# Patient Record
Sex: FEMALE | Race: WHITE | NOT HISPANIC OR LATINO | Employment: OTHER | URBAN - METROPOLITAN AREA
[De-identification: names, ages, dates, MRNs, and addresses within clinical notes are randomized per-mention and may not be internally consistent; named-entity substitution may affect disease eponyms.]

---

## 2023-05-16 ENCOUNTER — HOSPITAL ENCOUNTER (EMERGENCY)
Facility: MEDICAL CENTER | Age: 73
End: 2023-05-16
Attending: EMERGENCY MEDICINE
Payer: MEDICARE

## 2023-05-16 VITALS
HEART RATE: 68 BPM | OXYGEN SATURATION: 94 % | TEMPERATURE: 98.6 F | WEIGHT: 158.73 LBS | RESPIRATION RATE: 17 BRPM | DIASTOLIC BLOOD PRESSURE: 98 MMHG | SYSTOLIC BLOOD PRESSURE: 224 MMHG

## 2023-05-16 DIAGNOSIS — K76.89 HEPATIC CYST: ICD-10-CM

## 2023-05-16 DIAGNOSIS — R10.9 ABDOMINAL DISCOMFORT: ICD-10-CM

## 2023-05-16 DIAGNOSIS — R11.15 CYCLICAL VOMITING: ICD-10-CM

## 2023-05-16 LAB
ALBUMIN SERPL BCP-MCNC: 4.5 G/DL (ref 3.2–4.9)
ALBUMIN/GLOB SERPL: 1.5 G/DL
ALP SERPL-CCNC: 56 U/L (ref 30–99)
ALT SERPL-CCNC: 21 U/L (ref 2–50)
ANION GAP SERPL CALC-SCNC: 12 MMOL/L (ref 7–16)
AST SERPL-CCNC: 24 U/L (ref 12–45)
BASOPHILS # BLD AUTO: 1.3 % (ref 0–1.8)
BASOPHILS # BLD: 0.07 K/UL (ref 0–0.12)
BILIRUB SERPL-MCNC: 0.7 MG/DL (ref 0.1–1.5)
BUN SERPL-MCNC: 13 MG/DL (ref 8–22)
CALCIUM ALBUM COR SERPL-MCNC: 8.8 MG/DL (ref 8.5–10.5)
CALCIUM SERPL-MCNC: 9.2 MG/DL (ref 8.5–10.5)
CHLORIDE SERPL-SCNC: 103 MMOL/L (ref 96–112)
CO2 SERPL-SCNC: 25 MMOL/L (ref 20–33)
CREAT SERPL-MCNC: 0.73 MG/DL (ref 0.5–1.4)
EKG IMPRESSION: NORMAL
EOSINOPHIL # BLD AUTO: 0.17 K/UL (ref 0–0.51)
EOSINOPHIL NFR BLD: 3.1 % (ref 0–6.9)
ERYTHROCYTE [DISTWIDTH] IN BLOOD BY AUTOMATED COUNT: 44.2 FL (ref 35.9–50)
GFR SERPLBLD CREATININE-BSD FMLA CKD-EPI: 86 ML/MIN/1.73 M 2
GLOBULIN SER CALC-MCNC: 3 G/DL (ref 1.9–3.5)
GLUCOSE SERPL-MCNC: 89 MG/DL (ref 65–99)
HCT VFR BLD AUTO: 47.5 % (ref 37–47)
HGB BLD-MCNC: 15.6 G/DL (ref 12–16)
IMM GRANULOCYTES # BLD AUTO: 0.01 K/UL (ref 0–0.11)
IMM GRANULOCYTES NFR BLD AUTO: 0.2 % (ref 0–0.9)
LIPASE SERPL-CCNC: 44 U/L (ref 11–82)
LYMPHOCYTES # BLD AUTO: 1.86 K/UL (ref 1–4.8)
LYMPHOCYTES NFR BLD: 34 % (ref 22–41)
MCH RBC QN AUTO: 30.6 PG (ref 27–33)
MCHC RBC AUTO-ENTMCNC: 32.8 G/DL (ref 33.6–35)
MCV RBC AUTO: 93.3 FL (ref 81.4–97.8)
MONOCYTES # BLD AUTO: 0.44 K/UL (ref 0–0.85)
MONOCYTES NFR BLD AUTO: 8 % (ref 0–13.4)
NEUTROPHILS # BLD AUTO: 2.92 K/UL (ref 2–7.15)
NEUTROPHILS NFR BLD: 53.4 % (ref 44–72)
NRBC # BLD AUTO: 0 K/UL
NRBC BLD-RTO: 0 /100 WBC
PLATELET # BLD AUTO: 255 K/UL (ref 164–446)
PMV BLD AUTO: 10.9 FL (ref 9–12.9)
POTASSIUM SERPL-SCNC: 3.9 MMOL/L (ref 3.6–5.5)
PROT SERPL-MCNC: 7.5 G/DL (ref 6–8.2)
RBC # BLD AUTO: 5.09 M/UL (ref 4.2–5.4)
SODIUM SERPL-SCNC: 140 MMOL/L (ref 135–145)
WBC # BLD AUTO: 5.5 K/UL (ref 4.8–10.8)

## 2023-05-16 PROCEDURE — 99284 EMERGENCY DEPT VISIT MOD MDM: CPT

## 2023-05-16 PROCEDURE — 36415 COLL VENOUS BLD VENIPUNCTURE: CPT

## 2023-05-16 PROCEDURE — 80053 COMPREHEN METABOLIC PANEL: CPT

## 2023-05-16 PROCEDURE — 93005 ELECTROCARDIOGRAM TRACING: CPT | Performed by: EMERGENCY MEDICINE

## 2023-05-16 PROCEDURE — 700102 HCHG RX REV CODE 250 W/ 637 OVERRIDE(OP): Performed by: EMERGENCY MEDICINE

## 2023-05-16 PROCEDURE — 85025 COMPLETE CBC W/AUTO DIFF WBC: CPT

## 2023-05-16 PROCEDURE — 83690 ASSAY OF LIPASE: CPT

## 2023-05-16 PROCEDURE — A9270 NON-COVERED ITEM OR SERVICE: HCPCS | Performed by: EMERGENCY MEDICINE

## 2023-05-16 RX ORDER — SUCRALFATE ORAL 1 G/10ML
1 SUSPENSION ORAL 4 TIMES DAILY PRN
Qty: 414 ML | Refills: 2 | Status: SHIPPED | OUTPATIENT
Start: 2023-05-16 | End: 2023-05-26

## 2023-05-16 RX ORDER — DICYCLOMINE HCL 20 MG
20 TABLET ORAL EVERY 6 HOURS PRN
Qty: 120 TABLET | Refills: 1 | Status: SHIPPED | OUTPATIENT
Start: 2023-05-16 | End: 2023-07-15

## 2023-05-16 RX ORDER — LOSARTAN POTASSIUM 50 MG/1
50 TABLET ORAL ONCE
Status: COMPLETED | OUTPATIENT
Start: 2023-05-16 | End: 2023-05-16

## 2023-05-16 RX ADMIN — LOSARTAN POTASSIUM 50 MG: 50 TABLET, FILM COATED ORAL at 17:34

## 2023-05-16 NOTE — ED NOTES
Pt comes in complaining of left upper abd pain. Pt stating she has had the pain for approx 10 days. Pt reporting the same thing happened 2 years ago. Pt stating she did have a CT scan at another facility. Pt found to have an irregular heartbeat, denies hx of the same.

## 2023-05-17 NOTE — ED NOTES
Pt discharged home as ordered by erp. Pt given 2 paper scripts and instructed to follow up with her PCP and GI. Pt verbalized understanding. Pt left ambulating independently with her

## 2023-05-17 NOTE — ED PROVIDER NOTES
"ED Provider Note    CHIEF COMPLAINT  Chief Complaint   Patient presents with    Abdominal Pain     Seen in [facility] for same.  Pt states instructed to come to ED for eval of a cyst on her liver       EXTERNAL RECORDS REVIEWED  Patient presents with recently performed outside imagings showing a 10 cm benign-appearing right liver cyst.    HPI/ROS  LIMITATION TO HISTORY       OUTSIDE HISTORIAN(S):   at bedside contributes to history.    Zarina Hagen is a pleasant, anxious, cooperative 72 y.o. female who presents to the emergency department reporting episodes of left upper quadrant abdominal pain associated with nausea and vomiting, intermittently for the past 2 to 3 years.  She has had episodes recently, but denies current symptoms.  She cannot identify exacerbating or relieving factors for these symptoms.  They resolve spontaneously.  She sometimes takes Aleve, but does not take any other medications for the symptoms.  She had an outside hospital CT scan showing a 2 cm benign-appearing renal cyst, and a 10 cm benign-appearing right liver cyst.  She has been followed by her primary care provider, who she says was considering referring her to gastroenterology.  However, the patient says she was told to drive to this hospital from [place] if she had more of these \"attacks.\"This most recent episode resolved prior to arrival.  She denies dysuria, fevers, constipation or diarrhea.  As above, she does not take any GI medications, she also does not take stool softeners.  She has a known history of hypertension and her primary care provider recently increased her Cozaar from 25 to 50 mg.  She has not taken it yet today.      PAST MEDICAL HISTORY   Chronic/episodic abdominal pain with vomiting    SURGICAL HISTORY  patient denies any surgical history    FAMILY HISTORY  No family history on file.    SOCIAL HISTORY  Social History     Tobacco Use    Smoking status: Not on file    Smokeless tobacco: Not on file   Substance " and Sexual Activity    Alcohol use: Not on file    Drug use: Not on file    Sexual activity: Not on file       CURRENT MEDICATIONS  Home Medications    **Home medications have not yet been reviewed for this encounter**         ALLERGIES  No Known Allergies      PHYSICAL EXAM  VITAL SIGNS: BP (!) 224/98   Pulse 68   Temp 37 °C (98.6 °F) (Temporal)   Resp 17   Wt 72 kg (158 lb 11.7 oz)   SpO2 94%   Pulse ox interpretation: I interpret this pulse ox as normal.  Constitutional: Alert in no apparent distress.  HENT: No signs of trauma, Bilateral external ears normal, Nose normal.   Eyes: Conjunctiva normal, Non-icteric.   Neck: Normal range of motion, Supple, No stridor.   Lymphatic: No lymphadenopathy noted.   Cardiovascular: Regular rate and rhythm, no murmurs.   Thorax & Lungs: Normal breath sounds, No respiratory distress, No wheezing  Abdomen: Bowel sounds normal, Soft, No tenderness, No masses, No pulsatile masses. No peritoneal signs.  Skin: Warm, Dry, No erythema, No rash.   Extremities: Intact distal pulses, No edema, No cyanosis.  Musculoskeletal: Good range of motion in all major joints. No or major deformities noted.   Neurologic: Alert , Normal motor function, Normal sensory function, No focal deficits noted.   Psychiatric: Affect anxious, judgment normal, Mood normal.         DIAGNOSTIC STUDIES / PROCEDURES  EKG  I have independently interpreted this EKG  Results for orders placed or performed during the hospital encounter of 23   EKG   Result Value Ref Range    Report       [facility] Emergency Dept.    Test Date:  2023  Pt Name:    GERRY KENDALL               Department: ER  MRN:        [id number]                      Room:       [facility]  Gender:     Female                       Technician: [id number]  :        [id number]                   Requested By:MELY IBARRA  Order #:    [id number]                    Reading MD: MELY IBARRA MD    Measurements  Intervals                                 Axis  Rate:       88                           P:          35  IN:         167                          QRS:        -39  QRSD:       100                          T:          28  QT:         424  QTc:        513    Interpretive Statements  Sinus rhythm  Multiform ventricular premature complexes  RSR' in V1 or V2, right VCD or RVH  Inferior infarct, old  No previous ECG available for comparison  Electronically Signed On 5- 17:15:10 PDT by MELY IBARRA MD           LABS  Labs Reviewed   CBC WITH DIFFERENTIAL - Abnormal; Notable for the following components:       Result Value    Hematocrit 47.5 (*)     MCHC 32.8 (*)     All other components within normal limits   COMP METABOLIC PANEL   LIPASE   CORRECTED CALCIUM   ESTIMATED GFR   URINALYSIS           COURSE & MEDICAL DECISION MAKING    ED Observation Status? No; Patient does not meet criteria for ED Observation.     INITIAL ASSESSMENT, COURSE AND PLAN  Care Narrative:     4:30 PM patient evaluated at the bedside.  Her per protocol abdominal pain protocol order set has been completed.  There is no sign of infection, hepatobiliary disease, pancreatitis, urinary tract infection, or any other significant abnormality.  The patient I had a long conversation in the presence of her  about the chronicity and recurrent nature of her symptoms, the benign appearance of the noted cysts, and the questionable relationship between the cysts and her symptoms.  Given these chronic symptoms, abnormal but benign appearing images, and comprehensively normal laboratory studies, think it is safe and appropriate to discharge this patient to follow-up with gastroenterology.  Further characterization of the liver cyst would likely necessitate MRI, which can be facilitated in the outpatient setting.  The patient is agreeable to this plan of care.  We discussed symptomatic support with Bentyl, Carafate, and Colace, in addition to short-term use of  antacids, for no more than 2 weeks at a time.  She has tried antacids in the past, but not the other medications.  Patient has not taken her Cozaar tonight, and is quite hypertensive, but is asymptomatic with respect to these numbers.  She will be treated with Cozaar prior to discharge.         DISPOSITION AND DISCUSSIONS    Escalation of care considered, and ultimately not performed:diagnostic imaging was considered, but the patient had a very recent CT of the abdomen and pelvis which showed a renal cyst and a liver cyst, both benign appearing.    Barriers to care at this time, including but not limited to: Patient's primary care provider is several hours away.     Decision tools and prescription drugs considered including, but not limited to: Medication modification , but the patient has not established a primary care provider who is managing her blood pressure .    FINAL DIAGNOSIS  1. Abdominal discomfort    2. Cyclical vomiting    3. Hepatic cyst           Electronically signed by: Sheldon Patiño M.D., 5/16/2023 7:03 PM

## 2023-05-17 NOTE — DISCHARGE INSTRUCTIONS
Your blood work was reassuring.  There is no sign of infection or dehydration or problems with your liver or gallbladder or pancreas on your blood work.  Use the medications that we have prescribed to help with your symptoms.  We have also placed a referral to help you get connected to gastroenterology.  Our schedulers will contact you to help.  You can also use the contacts listed here to make your own appointment with either [facility] or [facility].  Both groups have multiple locations throughout the area.

## 2023-07-13 NOTE — PROGRESS NOTES
Subjective:   8/8/2023 12:45 PM  Primary care physician: Pcp Pt States None  Referring Provider: RUSTY Paulino    Chief Complaint:   Chief Complaint   Patient presents with    Other     ABD PAIN  HEPATIC CYST  CT: 10 CM  IMAGES AT [facility]  Bring CD and report of MRI     Diagnosis:   1. Hepatic cyst  NM-BILIARY (HIDA) SCAN WITH CCK    BUN+CREAT      2. Pain of upper abdomen  NM-BILIARY (HIDA) SCAN WITH CCK    BUN+CREAT      3. Gastroesophageal reflux disease, unspecified whether esophagitis present        4. Epigastric pain          History of presenting illness:    Zarina Hagen is a pleasant 72 y.o. female with history including HTN, GERD, nausea/vomiting, imbalance, who presented with long-standing dull right-sided abdominal discomfort near under her rib cage. She also reports occasional sharp epigastric discomfort which has occurred several times, most recently in May 2023. She takes PPI for GERD.     CT ABDOMEN PELVIS without contrast on 5/9/23 noted multiple liver cysts, the largest 10.8 x 8.7 cm.    Patient seen in ED on 5/16/23 with complaint of episodes of left upper quadrant abdominal pain associated with nausea and vomiting, intermittently for the past 2 to 3 years.    Update 8/8/23    She is here today for evaluation what appears to be a fairly large liver cyst involving segment 5 and 6 in the inferior lobe of the right lobe of the liver.  The patient states that this has been going on for several years and over time its gotten worse.  The patient had an MRI which I personally reviewed from showing this mass that is appears to be a simple cyst.  She has multiple cyst throughout the liver but this is the most dominant 1 and appears to be stretching the capsule liver.  She also has a little bit of thickening of her gallbladder.  The cyst itself is adjacent to the gallbladder.  It is difficult to assess whether her pain is related to the cyst or cyst the gallbladder but her pain is not tied to  eating.  The patient has had a hysterectomy the old-fashioned way with a low midline incision.  She is fairly healthy.  She does state when she takes a deep breath she gets short of breath or has worsening pain.  She is here with her  discuss neck steps.  We reviewed the CT scan and the MRI from 2023.    History reviewed. No pertinent past medical history.  Past Surgical History:   Procedure Laterality Date    ABDOMINAL HYSTERECTOMY TOTAL      APPENDECTOMY       No Known Allergies  Outpatient Encounter Medications as of 2023   Medication Sig Dispense Refill    losartan (COZAAR) 50 MG Tab Take 50 mg by mouth every day.      carvedilol (COREG) 6.25 MG Tab Take 6.25 mg by mouth 2 times a day with meals.      dicyclomine (BENTYL) 20 MG Tab Take 20 mg by mouth every 6 hours.      hydroCHLOROthiazide (HYDRODIURIL) 25 MG Tab Take 25 mg by mouth every day.      [] dicyclomine (BENTYL) 20 MG Tab Take 1 Tablet by mouth every 6 hours as needed (abdominal cramps) for up to 60 days. 120 Tablet 1     No facility-administered encounter medications on file as of 2023.     Social History     Socioeconomic History    Marital status:      Spouse name: Not on file    Number of children: Not on file    Years of education: Not on file    Highest education level: Not on file   Occupational History    Not on file   Tobacco Use    Smoking status: Never    Smokeless tobacco: Never   Vaping Use    Vaping Use: Never used   Substance and Sexual Activity    Alcohol use: Not Currently     Comment: Very Little    Drug use: Never    Sexual activity: Not on file   Other Topics Concern    Not on file   Social History Narrative    Not on file     Social Determinants of Health     Financial Resource Strain: Not on file   Food Insecurity: Not on file   Transportation Needs: Not on file   Physical Activity: Not on file   Stress: Not on file   Social Connections: Not on file   Intimate Partner Violence: Not on file  "  Housing Stability: Not on file      Social History     Tobacco Use   Smoking Status Never   Smokeless Tobacco Never     Social History     Substance and Sexual Activity   Alcohol Use Not Currently    Comment: Very Little     Social History     Substance and Sexual Activity   Drug Use Never      History reviewed. No pertinent family history.    Review of Systems   Gastrointestinal:  Positive for abdominal pain.   All other systems reviewed and are negative.       Objective:   /76 (BP Location: Right arm, Patient Position: Sitting, BP Cuff Size: Adult)   Pulse 68   Temp 37.3 °C (99.1 °F) (Temporal)   Ht 1.702 m (5' 7\")   Wt 69.4 kg (153 lb)   SpO2 100%   BMI 23.96 kg/m²     Physical Exam  Vitals and nursing note reviewed.   Constitutional:       Appearance: Normal appearance.   HENT:      Head: Normocephalic.      Mouth/Throat:      Mouth: Mucous membranes are moist.      Pharynx: Oropharynx is clear.   Eyes:      Extraocular Movements: Extraocular movements intact.      Conjunctiva/sclera: Conjunctivae normal.      Pupils: Pupils are equal, round, and reactive to light.   Cardiovascular:      Pulses: Normal pulses.      Heart sounds: Normal heart sounds.   Pulmonary:      Effort: Pulmonary effort is normal.      Breath sounds: Normal breath sounds.   Abdominal:      General: Abdomen is flat. Bowel sounds are normal.      Palpations: Abdomen is soft. There is mass.      Tenderness: There is abdominal tenderness.      Comments: Right upper quadrant discomfort   Musculoskeletal:         General: Normal range of motion.      Cervical back: Normal range of motion and neck supple.   Skin:     General: Skin is warm and dry.   Neurological:      General: No focal deficit present.      Mental Status: She is alert and oriented to person, place, and time.   Psychiatric:         Mood and Affect: Mood normal.         Behavior: Behavior normal.         Thought Content: Thought content normal.         Judgment: " Judgment normal.         Labs   Latest Reference Range & Units 05/16/23 13:28   WBC 4.8 - 10.8 K/uL 5.5   RBC 4.20 - 5.40 M/uL 5.09   Hemoglobin 12.0 - 16.0 g/dL 15.6   Hematocrit 37.0 - 47.0 % 47.5 (H)   MCV 81.4 - 97.8 fL 93.3   MCH 27.0 - 33.0 pg 30.6   MCHC 33.6 - 35.0 g/dL 32.8 (L)   RDW 35.9 - 50.0 fL 44.2   Platelet Count 164 - 446 K/uL 255   MPV 9.0 - 12.9 fL 10.9   (H): Data is abnormally high  (L): Data is abnormally low     Latest Reference Range & Units 05/16/23 13:28   Sodium 135 - 145 mmol/L 140   Potassium 3.6 - 5.5 mmol/L 3.9   Chloride 96 - 112 mmol/L 103   Co2 20 - 33 mmol/L 25   Anion Gap 7.0 - 16.0  12.0   Glucose 65 - 99 mg/dL 89   Bun 8 - 22 mg/dL 13   Creatinine 0.50 - 1.40 mg/dL 0.73   GFR (CKD-EPI) >60 mL/min/1.73 m 2 86   Calcium 8.5 - 10.5 mg/dL 9.2   Correct Calcium 8.5 - 10.5 mg/dL 8.8   AST(SGOT) 12 - 45 U/L 24   ALT(SGPT) 2 - 50 U/L 21   Alkaline Phosphatase 30 - 99 U/L 56   Total Bilirubin 0.1 - 1.5 mg/dL 0.7   Albumin 3.2 - 4.9 g/dL 4.5   Total Protein 6.0 - 8.2 g/dL 7.5   Globulin 1.9 - 3.5 g/dL 3.0   A-G Ratio g/dL 1.5   Lipase 11 - 82 U/L 44     Imaging  CT ABDOMEN PELVIS W/O (5/9/23)  Multiple liver cysts, the largest 10.8 x 8.7 cm.    Pathology  N/A    Procedures  N/A    Diagnosis:     1. Hepatic cyst  NM-BILIARY (HIDA) SCAN WITH CCK    BUN+CREAT      2. Pain of upper abdomen  NM-BILIARY (HIDA) SCAN WITH CCK    BUN+CREAT      3. Gastroesophageal reflux disease, unspecified whether esophagitis present        4. Epigastric pain          Medical Decision Making:  Today's Assessment / Status / Plan:     In light of the present findings, my recommendation is to proceed with a HIDA scan to make sure this does not connect with the biliary system, if it is negative then we would proceed with a marsupialization of the liver cyst and possibly a cholecystectomy.  We may approach this with the robot or laparoscope.  We discussed the risks and benefits including bleeding, infection, bile  leaks and the need to convert to an open procedure for safety.  She has agreed to above plan and once we have the results of the HIDA we will schedule her.    I, Dr. Vicente have entered, reviewed and confirmed the above diagnoses related to this patient on this date of service, 8/8/2023 12:45 PM.    She agreed and verbalized her agreement and understanding with the current plan. I answered all questions and concerns she has at this time and advised her to call at any time in the interim with questions or concerns in regards to her care.    Thank you for allowing me to participate in her care, I will continue to follow closely.       Please note that this dictation was created using voice recognition software. I have made every reasonable attempt to correct obvious errors, but I expect that there are errors of grammar and possibly content that I did not discover before finalizing the note.     Thank you for this consultation and allowing me to participate in your patient's care. If I can be of further service please contact my office.      I, Dr Vicente, have entered, reviewed and confirmed the above diagnoses related to this patient on this date of service, as per the time and date noted at top of this note.

## 2023-07-18 ENCOUNTER — HOSPITAL ENCOUNTER (OUTPATIENT)
Dept: RADIOLOGY | Facility: MEDICAL CENTER | Age: 73
End: 2023-07-18
Payer: MEDICARE

## 2023-08-08 ENCOUNTER — OFFICE VISIT (OUTPATIENT)
Dept: SURGICAL ONCOLOGY | Facility: MEDICAL CENTER | Age: 73
End: 2023-08-08
Payer: MEDICARE

## 2023-08-08 VITALS
WEIGHT: 153 LBS | TEMPERATURE: 99.1 F | OXYGEN SATURATION: 100 % | HEART RATE: 68 BPM | BODY MASS INDEX: 24.01 KG/M2 | DIASTOLIC BLOOD PRESSURE: 76 MMHG | HEIGHT: 67 IN | SYSTOLIC BLOOD PRESSURE: 128 MMHG

## 2023-08-08 DIAGNOSIS — R10.10 PAIN OF UPPER ABDOMEN: ICD-10-CM

## 2023-08-08 DIAGNOSIS — K21.9 GASTROESOPHAGEAL REFLUX DISEASE, UNSPECIFIED WHETHER ESOPHAGITIS PRESENT: ICD-10-CM

## 2023-08-08 DIAGNOSIS — K76.89 HEPATIC CYST: ICD-10-CM

## 2023-08-08 DIAGNOSIS — R10.13 EPIGASTRIC PAIN: ICD-10-CM

## 2023-08-08 PROCEDURE — 3078F DIAST BP <80 MM HG: CPT | Performed by: SURGERY

## 2023-08-08 PROCEDURE — 3074F SYST BP LT 130 MM HG: CPT | Performed by: SURGERY

## 2023-08-08 PROCEDURE — 99205 OFFICE O/P NEW HI 60 MIN: CPT | Performed by: SURGERY

## 2023-08-08 RX ORDER — LOSARTAN POTASSIUM 50 MG/1
50 TABLET ORAL DAILY
COMMUNITY

## 2023-08-08 RX ORDER — CARVEDILOL 6.25 MG/1
6.25 TABLET ORAL 2 TIMES DAILY WITH MEALS
COMMUNITY
End: 2023-08-30

## 2023-08-08 RX ORDER — HYDROCHLOROTHIAZIDE 25 MG/1
25 TABLET ORAL DAILY
COMMUNITY

## 2023-08-08 RX ORDER — DICYCLOMINE HCL 20 MG
20 TABLET ORAL EVERY 6 HOURS
COMMUNITY

## 2023-08-08 ASSESSMENT — ENCOUNTER SYMPTOMS: ABDOMINAL PAIN: 1

## 2023-08-08 ASSESSMENT — FIBROSIS 4 INDEX: FIB4 SCORE: 1.5

## 2023-08-16 ENCOUNTER — HOSPITAL ENCOUNTER (OUTPATIENT)
Dept: RADIOLOGY | Facility: MEDICAL CENTER | Age: 73
End: 2023-08-16
Attending: NURSE PRACTITIONER
Payer: MEDICARE

## 2023-08-16 DIAGNOSIS — K76.89 HEPATIC CYST: ICD-10-CM

## 2023-08-16 DIAGNOSIS — R10.10 PAIN OF UPPER ABDOMEN: ICD-10-CM

## 2023-08-16 PROCEDURE — 78227 HEPATOBIL SYST IMAGE W/DRUG: CPT

## 2023-08-16 NOTE — PROGRESS NOTES
Subjective:   8/22/2023 10:25 AM  Primary care physician: Pcp Pt States None  Referring Provider: RUSTY Paulino    Chief Complaint:   Chief Complaint   Patient presents with    Other     ABD PAIN  HEPATIC CYST  CT: 10 CM  HIDA: NEG     Diagnosis:   1. Hepatic cyst        2. Pain of upper abdomen        3. Gastroesophageal reflux disease, unspecified whether esophagitis present        4. Epigastric pain          History of presenting illness:    Zarina Hagen is a pleasant 72 y.o. female with history including HTN, GERD, nausea/vomiting, imbalance, who presented with long-standing dull right-sided abdominal discomfort near under her rib cage. She also reports occasional sharp epigastric discomfort which has occurred several times, most recently in May 2023. She takes PPI for GERD.      CT ABDOMEN PELVIS without contrast on 5/9/23 noted multiple liver cysts, the largest 10.8 x 8.7 cm.     Patient seen in ED on 5/16/23 with complaint of episodes of left upper quadrant abdominal pain associated with nausea and vomiting, intermittently for the past 2 to 3 years.     Update 8/8/23  She is here today for evaluation what appears to be a fairly large liver cyst involving segment 5 and 6 in the inferior lobe of the right lobe of the liver.  The patient states that this has been going on for several years and over time its gotten worse.  The patient had an MRI which I personally reviewed from showing this mass that is appears to be a simple cyst.  She has multiple cyst throughout the liver but this is the most dominant 1 and appears to be stretching the capsule liver.  She also has a little bit of thickening of her gallbladder.  The cyst itself is adjacent to the gallbladder.  It is difficult to assess whether her pain is related to the cyst or cyst the gallbladder but her pain is not tied to eating.  The patient has had a hysterectomy the old-fashioned way with a low midline incision.  She is fairly healthy.  She  does state when she takes a deep breath she gets short of breath or has worsening pain.  She is here with her  discuss neck steps.  We reviewed the CT scan and the MRI from July 17 2023.    Update 8/22/23  This evaluation was conducted via [employer] using secure and encrypted videoconferencing technology. The patient was in their home in the [facility].  The patient's identity was confirmed and verbal consent was obtained for this virtual visit.     My total time spent caring for the patient on the day of the encounter was 32 minutes minutes. This does not include time spent on separately billable procedures/tests.      NM HIDA SCAN on 8/16/23 noted normal hepatobiliary scan. No evidence of acute cholecystitis.  Normal gallbladder ejection fraction. No agent noted outside of gallbladder or biliary ducts.    She is following up to day for evaluation and reading of her HIDA scan, and her MRI.  I have personally reviewed the HIDA scan it reveals that there is no uptake of the agent in the cyst also most likely does not communicate with the biliary system nor is it a biliary cystadenoma.  I have also reviewed the MRI and it does appear that this is significantly stretching the capsule.  After discussing with the patient that she states that she continues to have pain.  The cyst itself is simple thin-walled and no sign of mural nodules or septations.  It is deforming the capsule of the liver and that is most likely the source of her pain.  The patient continues to complain of pain and states that its become difficult for her to handle things daily life.  As of note on her HIDA scan her gallbladder did fill and empty well so we would not touch her gallbladder.  The patient is on the phone via telemedicine to discuss neck steps.      History reviewed. No pertinent past medical history.  Past Surgical History:   Procedure Laterality Date    ABDOMINAL HYSTERECTOMY TOTAL      APPENDECTOMY       No Known  Allergies  Outpatient Encounter Medications as of 8/22/2023   Medication Sig Dispense Refill    losartan (COZAAR) 50 MG Tab Take 50 mg by mouth every day.      carvedilol (COREG) 6.25 MG Tab Take 6.25 mg by mouth 2 times a day with meals.      dicyclomine (BENTYL) 20 MG Tab Take 20 mg by mouth every 6 hours.      hydroCHLOROthiazide (HYDRODIURIL) 25 MG Tab Take 25 mg by mouth every day.       No facility-administered encounter medications on file as of 8/22/2023.     Social History     Socioeconomic History    Marital status:      Spouse name: Not on file    Number of children: Not on file    Years of education: Not on file    Highest education level: Not on file   Occupational History    Not on file   Tobacco Use    Smoking status: Never    Smokeless tobacco: Never   Vaping Use    Vaping Use: Never used   Substance and Sexual Activity    Alcohol use: Not Currently     Comment: Very Little    Drug use: Never    Sexual activity: Not on file   Other Topics Concern    Not on file   Social History Narrative    Not on file     Social Determinants of Health     Financial Resource Strain: Not on file   Food Insecurity: Not on file   Transportation Needs: Not on file   Physical Activity: Not on file   Stress: Not on file   Social Connections: Not on file   Intimate Partner Violence: Not on file   Housing Stability: Not on file      Social History     Tobacco Use   Smoking Status Never   Smokeless Tobacco Never     Social History     Substance and Sexual Activity   Alcohol Use Not Currently    Comment: Very Little     Social History     Substance and Sexual Activity   Drug Use Never      History reviewed. No pertinent family history.    Review of Systems   Gastrointestinal:  Positive for abdominal pain.   All other systems reviewed and are negative.       Objective:   There were no vitals taken for this visit.    Physical Exam    Labs    Latest Reference Range & Units 05/16/23 13:28   WBC 4.8 - 10.8 K/uL 5.5   RBC  4.20 - 5.40 M/uL 5.09   Hemoglobin 12.0 - 16.0 g/dL 15.6   Hematocrit 37.0 - 47.0 % 47.5 (H)   MCV 81.4 - 97.8 fL 93.3   MCH 27.0 - 33.0 pg 30.6   MCHC 33.6 - 35.0 g/dL 32.8 (L)   RDW 35.9 - 50.0 fL 44.2   Platelet Count 164 - 446 K/uL 255   MPV 9.0 - 12.9 fL 10.9   (H): Data is abnormally high  (L): Data is abnormally low       Latest Reference Range & Units 05/16/23 13:28   Sodium 135 - 145 mmol/L 140   Potassium 3.6 - 5.5 mmol/L 3.9   Chloride 96 - 112 mmol/L 103   Co2 20 - 33 mmol/L 25   Anion Gap 7.0 - 16.0  12.0   Glucose 65 - 99 mg/dL 89   Bun 8 - 22 mg/dL 13   Creatinine 0.50 - 1.40 mg/dL 0.73   GFR (CKD-EPI) >60 mL/min/1.73 m 2 86   Calcium 8.5 - 10.5 mg/dL 9.2   Correct Calcium 8.5 - 10.5 mg/dL 8.8   AST(SGOT) 12 - 45 U/L 24   ALT(SGPT) 2 - 50 U/L 21   Alkaline Phosphatase 30 - 99 U/L 56   Total Bilirubin 0.1 - 1.5 mg/dL 0.7   Albumin 3.2 - 4.9 g/dL 4.5   Total Protein 6.0 - 8.2 g/dL 7.5   Globulin 1.9 - 3.5 g/dL 3.0   A-G Ratio g/dL 1.5   Lipase 11 - 82 U/L 44      Imaging  NM HIDA SCAN (8/16/23)  IMPRESSION:  Normal hepatobiliary scan. No evidence of acute cholecystitis.  Normal gallbladder ejection fraction.    CT ABDOMEN PELVIS W/O (5/9/23)  Multiple liver cysts, the largest 10.8 x 8.7 cm.     Pathology  N/A     Procedures  N/A    Diagnosis:     1. Hepatic cyst        2. Pain of upper abdomen        3. Gastroesophageal reflux disease, unspecified whether esophagitis present        4. Epigastric pain            Medical Decision Making:  Today's Assessment / Status / Plan:     In light of the present findings, the patient continues to have discomfort in her right upper quadrant.  We given her options including ultrasound-guided aspiration versus a marsupialization and she is elected to proceed with a laparoscopic marsupialization.  We discussed the risks and benefits of the procedure including bleeding, infection, bile leak, the possibility of having a recurrent cyst in the same location and the  possibility of converting to an open procedure for safety.  She understands this is outpatient and that this is more definitive than a aspiration.  She has agreed to above plan.  We will get her scheduled as soon as possible.    I, Dr. Vicente have entered, reviewed and confirmed the above diagnoses related to this patient on this date of service, 8/22/2023 10:25 AM.    She agreed and verbalized her agreement and understanding with the current plan. I answered all questions and concerns she has at this time and advised her to call at any time in the interim with questions or concerns in regards to her care.    Thank you for allowing me to participate in her care, I will continue to follow closely.       Please note that this dictation was created using voice recognition software. I have made every reasonable attempt to correct obvious errors, but I expect that there are errors of grammar and possibly content that I did not discover before finalizing the note.     Thank you for this consultation and allowing me to participate in your patient's care. If I can be of further service please contact my office.      I, Dr Vicente, have entered, reviewed and confirmed the above diagnoses related to this patient on this date of service, as per the time and date noted at top of this note.

## 2023-08-22 ENCOUNTER — TELEMEDICINE (OUTPATIENT)
Dept: SURGICAL ONCOLOGY | Facility: MEDICAL CENTER | Age: 73
End: 2023-08-22
Payer: MEDICARE

## 2023-08-22 ENCOUNTER — TELEPHONE (OUTPATIENT)
Dept: SURGICAL ONCOLOGY | Facility: MEDICAL CENTER | Age: 73
End: 2023-08-22

## 2023-08-22 DIAGNOSIS — R10.13 EPIGASTRIC PAIN: ICD-10-CM

## 2023-08-22 DIAGNOSIS — R10.10 PAIN OF UPPER ABDOMEN: ICD-10-CM

## 2023-08-22 DIAGNOSIS — K21.9 GASTROESOPHAGEAL REFLUX DISEASE, UNSPECIFIED WHETHER ESOPHAGITIS PRESENT: ICD-10-CM

## 2023-08-22 DIAGNOSIS — K76.89 HEPATIC CYST: ICD-10-CM

## 2023-08-22 PROCEDURE — 99214 OFFICE O/P EST MOD 30 MIN: CPT | Mod: 95 | Performed by: SURGERY

## 2023-08-22 ASSESSMENT — ENCOUNTER SYMPTOMS: ABDOMINAL PAIN: 1

## 2023-08-22 NOTE — TELEPHONE ENCOUNTER
Chief Complaint   Patient presents with     Recheck Medication     Follow up/check in       FLAKO Ramos at 7:29 AM on 4/11/2022   I called patient to schedule her surgery with Dr. Rodriguezulus no answer so I left a voicemail with my direct line.      Thank you,  [facility] Scheduler

## 2023-08-25 ENCOUNTER — APPOINTMENT (OUTPATIENT)
Dept: ADMISSIONS | Facility: MEDICAL CENTER | Age: 73
DRG: 407 | End: 2023-08-25
Attending: SURGERY
Payer: MEDICARE

## 2023-08-30 ENCOUNTER — PRE-ADMISSION TESTING (OUTPATIENT)
Dept: ADMISSIONS | Facility: MEDICAL CENTER | Age: 73
DRG: 407 | End: 2023-08-30
Attending: SURGERY
Payer: MEDICARE

## 2023-08-30 RX ORDER — OMEPRAZOLE 20 MG/1
20 CAPSULE, DELAYED RELEASE ORAL PRN
COMMUNITY

## 2023-09-06 ENCOUNTER — ANESTHESIA EVENT (OUTPATIENT)
Dept: SURGERY | Facility: MEDICAL CENTER | Age: 73
DRG: 407 | End: 2023-09-06
Payer: MEDICARE

## 2023-09-06 ENCOUNTER — PRE-ADMISSION TESTING (OUTPATIENT)
Dept: ADMISSIONS | Facility: MEDICAL CENTER | Age: 73
DRG: 407 | End: 2023-09-06
Attending: SURGERY
Payer: MEDICARE

## 2023-09-06 DIAGNOSIS — Z01.810 PRE-OPERATIVE CARDIOVASCULAR EXAMINATION: ICD-10-CM

## 2023-09-06 DIAGNOSIS — Z01.812 PRE-OPERATIVE LABORATORY EXAMINATION: ICD-10-CM

## 2023-09-06 LAB
ABO GROUP BLD: NORMAL
ALBUMIN SERPL BCP-MCNC: 4.2 G/DL (ref 3.2–4.9)
ALBUMIN/GLOB SERPL: 1.6 G/DL
ALP SERPL-CCNC: 46 U/L (ref 30–99)
ALT SERPL-CCNC: 19 U/L (ref 2–50)
ANION GAP SERPL CALC-SCNC: 9 MMOL/L (ref 7–16)
AST SERPL-CCNC: 21 U/L (ref 12–45)
BILIRUB SERPL-MCNC: 0.5 MG/DL (ref 0.1–1.5)
BLD GP AB SCN SERPL QL: NORMAL
BUN SERPL-MCNC: 17 MG/DL (ref 8–22)
CALCIUM ALBUM COR SERPL-MCNC: 8.8 MG/DL (ref 8.5–10.5)
CALCIUM SERPL-MCNC: 9 MG/DL (ref 8.5–10.5)
CHLORIDE SERPL-SCNC: 103 MMOL/L (ref 96–112)
CO2 SERPL-SCNC: 26 MMOL/L (ref 20–33)
CREAT SERPL-MCNC: 0.81 MG/DL (ref 0.5–1.4)
EKG IMPRESSION: NORMAL
ERYTHROCYTE [DISTWIDTH] IN BLOOD BY AUTOMATED COUNT: 44.2 FL (ref 35.9–50)
GFR SERPLBLD CREATININE-BSD FMLA CKD-EPI: 76 ML/MIN/1.73 M 2
GLOBULIN SER CALC-MCNC: 2.7 G/DL (ref 1.9–3.5)
GLUCOSE SERPL-MCNC: 129 MG/DL (ref 65–99)
HCT VFR BLD AUTO: 43.7 % (ref 37–47)
HGB BLD-MCNC: 13.8 G/DL (ref 12–16)
INR PPP: 1.19 (ref 0.87–1.13)
MCH RBC QN AUTO: 30 PG (ref 27–33)
MCHC RBC AUTO-ENTMCNC: 31.6 G/DL (ref 32.2–35.5)
MCV RBC AUTO: 95 FL (ref 81.4–97.8)
PLATELET # BLD AUTO: 230 K/UL (ref 164–446)
PMV BLD AUTO: 11.4 FL (ref 9–12.9)
POTASSIUM SERPL-SCNC: 3.5 MMOL/L (ref 3.6–5.5)
PROT SERPL-MCNC: 6.9 G/DL (ref 6–8.2)
PROTHROMBIN TIME: 15.2 SEC (ref 12–14.6)
RBC # BLD AUTO: 4.6 M/UL (ref 4.2–5.4)
RH BLD: NORMAL
SODIUM SERPL-SCNC: 138 MMOL/L (ref 135–145)
WBC # BLD AUTO: 4.5 K/UL (ref 4.8–10.8)

## 2023-09-06 PROCEDURE — 85610 PROTHROMBIN TIME: CPT

## 2023-09-06 PROCEDURE — 86900 BLOOD TYPING SEROLOGIC ABO: CPT

## 2023-09-06 PROCEDURE — 85027 COMPLETE CBC AUTOMATED: CPT

## 2023-09-06 PROCEDURE — 36415 COLL VENOUS BLD VENIPUNCTURE: CPT

## 2023-09-06 PROCEDURE — 93005 ELECTROCARDIOGRAM TRACING: CPT

## 2023-09-06 PROCEDURE — 93010 ELECTROCARDIOGRAM REPORT: CPT | Performed by: INTERNAL MEDICINE

## 2023-09-06 PROCEDURE — 86850 RBC ANTIBODY SCREEN: CPT

## 2023-09-06 PROCEDURE — 86901 BLOOD TYPING SEROLOGIC RH(D): CPT

## 2023-09-06 PROCEDURE — 80053 COMPREHEN METABOLIC PANEL: CPT

## 2023-09-07 ENCOUNTER — HOSPITAL ENCOUNTER (INPATIENT)
Facility: MEDICAL CENTER | Age: 73
LOS: 1 days | DRG: 407 | End: 2023-09-07
Attending: SURGERY | Admitting: SURGERY
Payer: MEDICARE

## 2023-09-07 ENCOUNTER — PHARMACY VISIT (OUTPATIENT)
Dept: PHARMACY | Facility: MEDICAL CENTER | Age: 73
End: 2023-09-07
Payer: COMMERCIAL

## 2023-09-07 ENCOUNTER — ANESTHESIA (OUTPATIENT)
Dept: SURGERY | Facility: MEDICAL CENTER | Age: 73
DRG: 407 | End: 2023-09-07
Payer: MEDICARE

## 2023-09-07 VITALS
WEIGHT: 160.72 LBS | TEMPERATURE: 96.6 F | BODY MASS INDEX: 25.22 KG/M2 | DIASTOLIC BLOOD PRESSURE: 67 MMHG | SYSTOLIC BLOOD PRESSURE: 137 MMHG | OXYGEN SATURATION: 96 % | RESPIRATION RATE: 17 BRPM | HEIGHT: 67 IN | HEART RATE: 61 BPM

## 2023-09-07 DIAGNOSIS — K76.89 HEPATIC CYST: ICD-10-CM

## 2023-09-07 DIAGNOSIS — K21.9 GASTROESOPHAGEAL REFLUX DISEASE, UNSPECIFIED WHETHER ESOPHAGITIS PRESENT: ICD-10-CM

## 2023-09-07 DIAGNOSIS — R10.13 EPIGASTRIC PAIN: ICD-10-CM

## 2023-09-07 LAB
ABO + RH BLD: NORMAL
PATHOLOGY CONSULT NOTE: NORMAL

## 2023-09-07 PROCEDURE — 160002 HCHG RECOVERY MINUTES (STAT): Performed by: SURGERY

## 2023-09-07 PROCEDURE — A9270 NON-COVERED ITEM OR SERVICE: HCPCS | Performed by: STUDENT IN AN ORGANIZED HEALTH CARE EDUCATION/TRAINING PROGRAM

## 2023-09-07 PROCEDURE — 160041 HCHG SURGERY MINUTES - EA ADDL 1 MIN LEVEL 4: Performed by: SURGERY

## 2023-09-07 PROCEDURE — 0FB14ZZ EXCISION OF RIGHT LOBE LIVER, PERCUTANEOUS ENDOSCOPIC APPROACH: ICD-10-PCS | Performed by: SURGERY

## 2023-09-07 PROCEDURE — 47300 SURGERY FOR LIVER LESION: CPT | Mod: AS | Performed by: NURSE PRACTITIONER

## 2023-09-07 PROCEDURE — 700101 HCHG RX REV CODE 250: Performed by: STUDENT IN AN ORGANIZED HEALTH CARE EDUCATION/TRAINING PROGRAM

## 2023-09-07 PROCEDURE — 160047 HCHG PACU  - EA ADDL 30 MINS PHASE II: Performed by: SURGERY

## 2023-09-07 PROCEDURE — 47300 SURGERY FOR LIVER LESION: CPT | Performed by: SURGERY

## 2023-09-07 PROCEDURE — 36415 COLL VENOUS BLD VENIPUNCTURE: CPT

## 2023-09-07 PROCEDURE — 306637 HCHG MISC ORTHO ITEM RC 0274

## 2023-09-07 PROCEDURE — 700111 HCHG RX REV CODE 636 W/ 250 OVERRIDE (IP): Mod: JZ | Performed by: STUDENT IN AN ORGANIZED HEALTH CARE EDUCATION/TRAINING PROGRAM

## 2023-09-07 PROCEDURE — 700111 HCHG RX REV CODE 636 W/ 250 OVERRIDE (IP): Performed by: STUDENT IN AN ORGANIZED HEALTH CARE EDUCATION/TRAINING PROGRAM

## 2023-09-07 PROCEDURE — 700105 HCHG RX REV CODE 258: Performed by: SURGERY

## 2023-09-07 PROCEDURE — 160046 HCHG PACU - 1ST 60 MINS PHASE II: Performed by: SURGERY

## 2023-09-07 PROCEDURE — 700101 HCHG RX REV CODE 250: Performed by: SURGERY

## 2023-09-07 PROCEDURE — 160048 HCHG OR STATISTICAL LEVEL 1-5: Performed by: SURGERY

## 2023-09-07 PROCEDURE — 160009 HCHG ANES TIME/MIN: Performed by: SURGERY

## 2023-09-07 PROCEDURE — RXMED WILLOW AMBULATORY MEDICATION CHARGE: Performed by: SURGERY

## 2023-09-07 PROCEDURE — 88304 TISSUE EXAM BY PATHOLOGIST: CPT

## 2023-09-07 PROCEDURE — 700102 HCHG RX REV CODE 250 W/ 637 OVERRIDE(OP): Performed by: STUDENT IN AN ORGANIZED HEALTH CARE EDUCATION/TRAINING PROGRAM

## 2023-09-07 PROCEDURE — 160035 HCHG PACU - 1ST 60 MINS PHASE I: Performed by: SURGERY

## 2023-09-07 PROCEDURE — 700105 HCHG RX REV CODE 258: Performed by: STUDENT IN AN ORGANIZED HEALTH CARE EDUCATION/TRAINING PROGRAM

## 2023-09-07 PROCEDURE — 160029 HCHG SURGERY MINUTES - 1ST 30 MINS LEVEL 4: Performed by: SURGERY

## 2023-09-07 PROCEDURE — 160025 RECOVERY II MINUTES (STATS): Performed by: SURGERY

## 2023-09-07 RX ORDER — DIPHENHYDRAMINE HYDROCHLORIDE 50 MG/ML
12.5 INJECTION INTRAMUSCULAR; INTRAVENOUS
Status: DISCONTINUED | OUTPATIENT
Start: 2023-09-07 | End: 2023-09-07 | Stop reason: HOSPADM

## 2023-09-07 RX ORDER — OXYCODONE HCL 5 MG/5 ML
5 SOLUTION, ORAL ORAL
Status: COMPLETED | OUTPATIENT
Start: 2023-09-07 | End: 2023-09-07

## 2023-09-07 RX ORDER — LIDOCAINE HYDROCHLORIDE 20 MG/ML
INJECTION, SOLUTION EPIDURAL; INFILTRATION; INTRACAUDAL; PERINEURAL PRN
Status: DISCONTINUED | OUTPATIENT
Start: 2023-09-07 | End: 2023-09-07 | Stop reason: SURG

## 2023-09-07 RX ORDER — PROMETHAZINE HYDROCHLORIDE 12.5 MG/1
12.5 TABLET ORAL EVERY 6 HOURS PRN
Qty: 30 TABLET | Refills: 0 | Status: SHIPPED | OUTPATIENT
Start: 2023-09-07

## 2023-09-07 RX ORDER — HYDROMORPHONE HYDROCHLORIDE 2 MG/ML
INJECTION, SOLUTION INTRAMUSCULAR; INTRAVENOUS; SUBCUTANEOUS PRN
Status: DISCONTINUED | OUTPATIENT
Start: 2023-09-07 | End: 2023-09-07 | Stop reason: SURG

## 2023-09-07 RX ORDER — LABETALOL HYDROCHLORIDE 5 MG/ML
INJECTION, SOLUTION INTRAVENOUS PRN
Status: DISCONTINUED | OUTPATIENT
Start: 2023-09-07 | End: 2023-09-07 | Stop reason: SURG

## 2023-09-07 RX ORDER — OXYCODONE HCL 5 MG/5 ML
10 SOLUTION, ORAL ORAL
Status: COMPLETED | OUTPATIENT
Start: 2023-09-07 | End: 2023-09-07

## 2023-09-07 RX ORDER — CEFAZOLIN SODIUM 1 G/3ML
INJECTION, POWDER, FOR SOLUTION INTRAMUSCULAR; INTRAVENOUS PRN
Status: DISCONTINUED | OUTPATIENT
Start: 2023-09-07 | End: 2023-09-07 | Stop reason: SURG

## 2023-09-07 RX ORDER — SODIUM CHLORIDE, SODIUM LACTATE, POTASSIUM CHLORIDE, CALCIUM CHLORIDE 600; 310; 30; 20 MG/100ML; MG/100ML; MG/100ML; MG/100ML
INJECTION, SOLUTION INTRAVENOUS CONTINUOUS
Status: ACTIVE | OUTPATIENT
Start: 2023-09-07 | End: 2023-09-07

## 2023-09-07 RX ORDER — LABETALOL HYDROCHLORIDE 5 MG/ML
5 INJECTION, SOLUTION INTRAVENOUS
Status: DISCONTINUED | OUTPATIENT
Start: 2023-09-07 | End: 2023-09-07 | Stop reason: HOSPADM

## 2023-09-07 RX ORDER — DEXAMETHASONE SODIUM PHOSPHATE 4 MG/ML
INJECTION, SOLUTION INTRA-ARTICULAR; INTRALESIONAL; INTRAMUSCULAR; INTRAVENOUS; SOFT TISSUE PRN
Status: DISCONTINUED | OUTPATIENT
Start: 2023-09-07 | End: 2023-09-07 | Stop reason: SURG

## 2023-09-07 RX ORDER — HYDROMORPHONE HYDROCHLORIDE 1 MG/ML
0.2 INJECTION, SOLUTION INTRAMUSCULAR; INTRAVENOUS; SUBCUTANEOUS
Status: DISCONTINUED | OUTPATIENT
Start: 2023-09-07 | End: 2023-09-07 | Stop reason: HOSPADM

## 2023-09-07 RX ORDER — EPHEDRINE SULFATE 50 MG/ML
5 INJECTION, SOLUTION INTRAVENOUS
Status: DISCONTINUED | OUTPATIENT
Start: 2023-09-07 | End: 2023-09-07 | Stop reason: HOSPADM

## 2023-09-07 RX ORDER — ONDANSETRON 2 MG/ML
INJECTION INTRAMUSCULAR; INTRAVENOUS PRN
Status: DISCONTINUED | OUTPATIENT
Start: 2023-09-07 | End: 2023-09-07 | Stop reason: SURG

## 2023-09-07 RX ORDER — IPRATROPIUM BROMIDE AND ALBUTEROL SULFATE 2.5; .5 MG/3ML; MG/3ML
3 SOLUTION RESPIRATORY (INHALATION)
Status: DISCONTINUED | OUTPATIENT
Start: 2023-09-07 | End: 2023-09-07 | Stop reason: HOSPADM

## 2023-09-07 RX ORDER — BUPIVACAINE HYDROCHLORIDE AND EPINEPHRINE 5; 5 MG/ML; UG/ML
INJECTION, SOLUTION PERINEURAL
Status: DISCONTINUED | OUTPATIENT
Start: 2023-09-07 | End: 2023-09-07 | Stop reason: HOSPADM

## 2023-09-07 RX ORDER — ONDANSETRON 2 MG/ML
4 INJECTION INTRAMUSCULAR; INTRAVENOUS
Status: COMPLETED | OUTPATIENT
Start: 2023-09-07 | End: 2023-09-07

## 2023-09-07 RX ORDER — HYDRALAZINE HYDROCHLORIDE 20 MG/ML
5 INJECTION INTRAMUSCULAR; INTRAVENOUS
Status: DISCONTINUED | OUTPATIENT
Start: 2023-09-07 | End: 2023-09-07 | Stop reason: HOSPADM

## 2023-09-07 RX ORDER — HYDROMORPHONE HYDROCHLORIDE 1 MG/ML
0.4 INJECTION, SOLUTION INTRAMUSCULAR; INTRAVENOUS; SUBCUTANEOUS
Status: DISCONTINUED | OUTPATIENT
Start: 2023-09-07 | End: 2023-09-07 | Stop reason: HOSPADM

## 2023-09-07 RX ORDER — SODIUM CHLORIDE, SODIUM LACTATE, POTASSIUM CHLORIDE, CALCIUM CHLORIDE 600; 310; 30; 20 MG/100ML; MG/100ML; MG/100ML; MG/100ML
INJECTION, SOLUTION INTRAVENOUS CONTINUOUS
Status: DISCONTINUED | OUTPATIENT
Start: 2023-09-07 | End: 2023-09-07 | Stop reason: HOSPADM

## 2023-09-07 RX ORDER — ROCURONIUM BROMIDE 10 MG/ML
INJECTION, SOLUTION INTRAVENOUS PRN
Status: DISCONTINUED | OUTPATIENT
Start: 2023-09-07 | End: 2023-09-07 | Stop reason: SURG

## 2023-09-07 RX ORDER — HYDROMORPHONE HYDROCHLORIDE 1 MG/ML
0.1 INJECTION, SOLUTION INTRAMUSCULAR; INTRAVENOUS; SUBCUTANEOUS
Status: DISCONTINUED | OUTPATIENT
Start: 2023-09-07 | End: 2023-09-07 | Stop reason: HOSPADM

## 2023-09-07 RX ORDER — TRAMADOL HYDROCHLORIDE 50 MG/1
50 TABLET ORAL EVERY 4 HOURS PRN
Qty: 30 TABLET | Refills: 0 | Status: SHIPPED | OUTPATIENT
Start: 2023-09-07 | End: 2023-09-17

## 2023-09-07 RX ORDER — SODIUM CHLORIDE, SODIUM LACTATE, POTASSIUM CHLORIDE, CALCIUM CHLORIDE 600; 310; 30; 20 MG/100ML; MG/100ML; MG/100ML; MG/100ML
INJECTION, SOLUTION INTRAVENOUS
Status: DISCONTINUED | OUTPATIENT
Start: 2023-09-07 | End: 2023-09-07 | Stop reason: SURG

## 2023-09-07 RX ADMIN — FENTANYL CITRATE 25 MCG: 50 INJECTION, SOLUTION INTRAMUSCULAR; INTRAVENOUS at 08:47

## 2023-09-07 RX ADMIN — FENTANYL CITRATE 25 MCG: 50 INJECTION, SOLUTION INTRAMUSCULAR; INTRAVENOUS at 08:38

## 2023-09-07 RX ADMIN — CEFAZOLIN 2 G: 1 INJECTION, POWDER, FOR SOLUTION INTRAMUSCULAR; INTRAVENOUS at 07:22

## 2023-09-07 RX ADMIN — SODIUM CHLORIDE, POTASSIUM CHLORIDE, SODIUM LACTATE AND CALCIUM CHLORIDE: 600; 310; 30; 20 INJECTION, SOLUTION INTRAVENOUS at 06:41

## 2023-09-07 RX ADMIN — ROCURONIUM BROMIDE 40 MG: 50 INJECTION, SOLUTION INTRAVENOUS at 07:19

## 2023-09-07 RX ADMIN — FENTANYL CITRATE 25 MCG: 50 INJECTION, SOLUTION INTRAMUSCULAR; INTRAVENOUS at 08:14

## 2023-09-07 RX ADMIN — SUGAMMADEX 200 MG: 100 INJECTION, SOLUTION INTRAVENOUS at 07:59

## 2023-09-07 RX ADMIN — OXYCODONE HYDROCHLORIDE 5 MG: 5 SOLUTION ORAL at 08:11

## 2023-09-07 RX ADMIN — PROPOFOL 120 MG: 10 INJECTION, EMULSION INTRAVENOUS at 07:19

## 2023-09-07 RX ADMIN — SODIUM CHLORIDE, POTASSIUM CHLORIDE, SODIUM LACTATE AND CALCIUM CHLORIDE: 600; 310; 30; 20 INJECTION, SOLUTION INTRAVENOUS at 07:15

## 2023-09-07 RX ADMIN — HYDROMORPHONE HYDROCHLORIDE 0.2 MG: 2 INJECTION INTRAMUSCULAR; INTRAVENOUS; SUBCUTANEOUS at 08:06

## 2023-09-07 RX ADMIN — LIDOCAINE HYDROCHLORIDE 100 MG: 20 INJECTION, SOLUTION EPIDURAL; INFILTRATION; INTRACAUDAL at 07:19

## 2023-09-07 RX ADMIN — DEXAMETHASONE SODIUM PHOSPHATE 4 MG: 4 INJECTION INTRA-ARTICULAR; INTRALESIONAL; INTRAMUSCULAR; INTRAVENOUS; SOFT TISSUE at 07:22

## 2023-09-07 RX ADMIN — ONDANSETRON 4 MG: 2 INJECTION INTRAMUSCULAR; INTRAVENOUS at 07:59

## 2023-09-07 RX ADMIN — ONDANSETRON 4 MG: 2 INJECTION INTRAMUSCULAR; INTRAVENOUS at 08:11

## 2023-09-07 RX ADMIN — DIPHENHYDRAMINE HYDROCHLORIDE 12.5 MG: 50 INJECTION INTRAMUSCULAR; INTRAVENOUS at 09:53

## 2023-09-07 RX ADMIN — LABETALOL HYDROCHLORIDE 5 MG: 5 INJECTION, SOLUTION INTRAVENOUS at 07:41

## 2023-09-07 RX ADMIN — FENTANYL CITRATE 25 MCG: 50 INJECTION, SOLUTION INTRAMUSCULAR; INTRAVENOUS at 08:22

## 2023-09-07 RX ADMIN — FENTANYL CITRATE 100 MCG: 50 INJECTION, SOLUTION INTRAMUSCULAR; INTRAVENOUS at 07:19

## 2023-09-07 RX ADMIN — HYDROMORPHONE HYDROCHLORIDE 0.2 MG: 2 INJECTION INTRAMUSCULAR; INTRAVENOUS; SUBCUTANEOUS at 07:39

## 2023-09-07 ASSESSMENT — PAIN DESCRIPTION - PAIN TYPE
TYPE: SURGICAL PAIN

## 2023-09-07 ASSESSMENT — FIBROSIS 4 INDEX: FIB4 SCORE: 1.53

## 2023-09-07 ASSESSMENT — PAIN SCALES - GENERAL: PAIN_LEVEL: 4

## 2023-09-07 NOTE — ANESTHESIA PREPROCEDURE EVALUATION
Case: [id number] Date/Time: 09/07/23 0645    Procedure: LAPAROSCOPIC MARSUPIALIZATION OF LIVER CYST    Pre-op diagnosis: HEPATIC CYST    Location: [facility] OR 10 / SURGERY [facility]    Surgeons: Richardson Vicente M.D.        71 yo F w/ HTN, GERD, hepatic cyst    Relevant Problems   GI   (positive) Gastroesophageal reflux disease         (positive) Hepatic cyst       Physical Exam    Airway   Mallampati: II  TM distance: >3 FB  Neck ROM: full       Cardiovascular - normal exam  Rhythm: regular  Rate: normal  (-) murmur     Dental - normal exam           Pulmonary - normal exam  Breath sounds clear to auscultation     Abdominal    Neurological - normal exam                 Anesthesia Plan    ASA 2       Plan - general       Airway plan will be ETT          Induction: intravenous    Postoperative Plan: Postoperative administration of opioids is intended.    Pertinent diagnostic labs and testing reviewed    Informed Consent:    Anesthetic plan and risks discussed with patient.    Use of blood products discussed with: patient whom consented to blood products.

## 2023-09-07 NOTE — OR NURSING
@0916 Pt arrived to Phase 2. Pt has complaints of 5/10 pain at incision sites. Pt has minor complaints of nausea. Dressing sites are clean dry and intact. Vital signs stable.     @0930 Pts  brought back to discharge area.    @0956 Pt has continued complaints of nausea. Pt medicated per mar for nausea. Pts  sent to pharmacy to  prescriptions.    @1023 discharge instructions discussion discussed with patient and patients  at bedside. Pts  verbalizes understanding.    @1041 Pt ambulated to restroom, able to urinate without difficulty. Pt ambulated from restroom to discharge chair. PIV removed. PT able to dress self without assistance.

## 2023-09-07 NOTE — ANESTHESIA TIME REPORT
Anesthesia Start and Stop Event Times     Date Time Event    9/7/2023 0640 Ready for Procedure     0715 Anesthesia Start     0808 Anesthesia Stop        Responsible Staff  09/07/23    Name Role Begin End    Jah Montes De Oca M.D. Anesth 0715 0808        Overtime Reason:  no overtime (within assigned shift)    Comments:

## 2023-09-07 NOTE — ANESTHESIA PROCEDURE NOTES
Airway    Date/Time: 9/7/2023 7:21 AM    Performed by: Jah Montes De Oca M.D.  Authorized by: Jah Montes De Oca M.D.    Location:  OR  Urgency:  Elective  Indications for Airway Management:  Anesthesia      Spontaneous Ventilation: absent    Sedation Level:  Deep  Preoxygenated: Yes    Patient Position:  Sniffing  Mask Difficulty Assessment:  1 - vent by mask  Final Airway Type:  Endotracheal airway  Final Endotracheal Airway:  ETT  Cuffed: Yes    Technique Used for Successful ETT Placement:  Direct laryngoscopy    Insertion Site:  Oral  Blade Type:  Daniela  Laryngoscope Blade/Videolaryngoscope Blade Size:  3  ETT Size (mm):  6.5  Measured from:  Teeth  ETT to Teeth (cm):  20  Placement Verified by: auscultation and capnometry    Cormack-Lehane Classification:  Grade I - full view of glottis  Number of Attempts at Approach:  1

## 2023-09-07 NOTE — OR NURSING
Patient recovered well in post-op. AAOx4.  VSS, on RA. Surgical sites JOSHUA, surgical dressing in place CDI. Abdomen soft, tender. Surgical pain managed through PO and IV medications. Antiemetics administered for nausea, see MAR, resolved. Spouse updated and discussed POC. No belongings in pacu. Report called to NEVAEH Ortiz. Patient transported to phase II w/ RN.

## 2023-09-07 NOTE — ANESTHESIA POSTPROCEDURE EVALUATION
Patient: Zarina Dunn Hilbish    Procedure Summary     Date: 09/07/23 Room / Location: [place] [address] / SURGERY [facility]    Anesthesia Start: 0715 Anesthesia Stop: 0808    Procedure: LAPAROSCOPIC MARSUPIALIZATION OF LIVER CYST (Abdomen) Diagnosis: (HEPATIC CYST)    Surgeons: Richardson Vicente M.D. Responsible Provider: Jah Montes De Oca M.D.    Anesthesia Type: general ASA Status: 2          Final Anesthesia Type: general  Last vitals  BP   Blood Pressure : (!) 178/88 (RN notified.)    Temp   36 °C (96.8 °F)    Pulse   77   Resp   16    SpO2   95 %      Anesthesia Post Evaluation    Patient location during evaluation: PACU  Patient participation: complete - patient participated  Level of consciousness: awake  Pain score: 4    Airway patency: patent  Anesthetic complications: no  Cardiovascular status: hemodynamically stable  Respiratory status: acceptable  Hydration status: acceptable    PONV: none          No notable events documented.     Nurse Pain Score: 0 (NPRS)

## 2023-09-07 NOTE — OP REPORT
DATE OF SERVICE:  09/07/2023     INDICATIONS:  The patient is a 72-year-old female patient of Dr. Shelby Echavarria   as well as Dr. Jaswinder Bowers, who was referred to me after developing a   large right lobe hepatic cyst involving segment 6 and the lower portion of 7,   causing discomfort especially on deep breath.  So, after a long discussion   with the patient, we elected to go ahead and proceed with a marsupialization.     SURGEON:  Richardson Vicente MD     ASSISTANT SURGEON:  Moises Wayne APRN:The indication for a surgical assistant in this surgery were indicated due to the complexity of the procedure.  Their role included aiding in the incision, retraction, holding of devices including cameras for laparoscopic procedures and closure of wounds.      ANESTHESIA:  General and local anesthetic.     ESTIMATED BLOOD LOSS:  Less than 5 mL.     COMPLICATIONS:  No complications.     FINDINGS:  Large tense hepatic cyst measuring approximately 12-15 cm in size   at its greatest dimension, not connected to the biliary system.     PROCEDURE:  Laparoscopic marsupialization of the segment 6 liver cyst.     SPECIMEN:  One cyst wall.     CASE CLASS:  Clean.     DESCRIPTION OF PROCEDURE: The patient was brought to OR, placed under general   anesthetic with both arms out, shaved, prepped with chlorhexidine scrub,   sterile drapes, preoperative antibiotics that was given.  Timeout was done,   which was adequate.  At that point, she was given 5 mL of 0.5% Marcaine with   epinephrine infraumbilically.  A curvilinear incision was made with 11 blade   and between 2 Kochers and Neelam, the abdomen was opened under direct vision   atraumatically.  A 0 Vicryl suture was placed on each side of fascia.  Dayana   trocar was inserted.  Abdomen was insufflated to 15 mm of pressure.  She was   then placed in reverse Trendelenburg left side down, exposing the right upper   quadrant.  On intraoperative ultrasound survey, we identified the  cyst in   segment 6.  We then placed two 5 mm ports percutaneously under vision   atraumatically in the right anterior axillary line and one along the midline   above the camera port.  We then elevated the right lobe of the liver, grasped   the cyst wall and then using the Thunderbeat, we incised the capsule.  We got   a large rush of clear fluid, which we suctioned off and then we started our   marsupialization at the level of the liver parenchyma over the edge of the   liver in segment 6.  We continued circumferentially around it, so we took off   the whole wall, at least 50% of the wall with the total diameter of the cyst.    So, at that point, the specimen was placed in the EndoCatch bag and pulled   out through the umbilicus and sent to pathology.  Reinspection showed no   bleeding, but we did use the Bovie electrocautery hook instrument along the   edges of the liver in order to just support our previous coagulation.  Once   completed, we irrigated with a liter of warm saline, reinspected, there was no   bleeding or bile leak after being placed in supine position. We then   desufflated the abdomen and suctioned off all the fluid.  We tied the   preplaced 0 Vicryl suture in the umbilicus, gave a total of 30 mL of 0.5%   Marcaine with epinephrine throughout the 3 port sites.  Skin staples, 2 x 2   Tegaderms, extubated and transferred to recovery room.        ______________________________  MD BRANDON Renee/GARRY/VIOLET    DD:  09/07/2023 08:12  DT:  09/07/2023 09:31    Job#:  [id number]    CC:Dr. Shelby MOONEY MD

## 2023-09-07 NOTE — DISCHARGE INSTRUCTIONS
HOME CARE INSTRUCTIONS    ACTIVITY: Rest and take it easy for the first 24 hours.  A responsible adult is recommended to remain with you during that time.  It is normal to feel sleepy.  We encourage you to not do anything that requires balance, judgment or coordination.    FOR 24 HOURS DO NOT:  Drive, operate machinery or run household appliances.  Drink beer or alcoholic beverages.  Make important decisions or sign legal documents.    SPECIAL INSTRUCTIONS:   OK to shower in AM with dressings on  DC dressing in 6 days  Follow-up with office MA for staple removal after 10-14 days  Follow up with Dr. Vicente in 1 month- ok to shower in AM with dressings on  Call MD if temperature greater than 101.5 °F or worsening pain.    DIET: To avoid nausea, slowly advance diet as tolerated, avoiding spicy or greasy foods for the first day.  Add more substantial food to your diet according to your physician's instructions.  Babies can be fed formula or breast milk as soon as they are hungry.  INCREASE FLUIDS AND FIBER TO AVOID CONSTIPATION.    MEDICATIONS: Resume taking daily medication.  Take prescribed pain medication with food.  If no medication is prescribed, you may take non-aspirin pain medication if needed.  PAIN MEDICATION CAN BE VERY CONSTIPATING.  Take a stool softener or laxative such as senokot, pericolace, or milk of magnesia if needed.    Prescription given for Promethazine, Tramadol.  Last pain medication given at Oxycodone 0811 AM.    A follow-up appointment should be arranged with your doctor in 1-2 Weeks; call to schedule.    You should CALL YOUR PHYSICIAN if you develop:  Fever greater than 101 degrees F.  Pain not relieved by medication, or persistent nausea or vomiting.  Excessive bleeding (blood soaking through dressing) or unexpected drainage from the wound.  Extreme redness or swelling around the incision site, drainage of pus or foul smelling drainage.  Inability to urinate or empty your bladder  within 8 hours.  Problems with breathing or chest pain.    You should call 911 if you develop problems with breathing or chest pain.  If you are unable to contact your doctor or surgical center, you should go to the nearest emergency room or urgent care center.      Physician's telephone #: [phone] Dr. Vicente    MILD FLU-LIKE SYMPTOMS ARE NORMAL.  YOU MAY EXPERIENCE GENERALIZED MUSCLE ACHES, THROAT IRRITATION, HEADACHE AND/OR SOME NAUSEA.    If any questions arise, call your doctor.  If your doctor is not available, please feel free to call the Surgical Center at [phone].  The Center is open Monday through Friday from 7AM to 7PM.      A registered nurse may call you a few days after your surgery to see how you are doing after your procedure.    You may also receive a survey in the mail within the next two weeks and we ask that you take a few moments to complete the survey and return it to us.  Our goal is to provide you with very good care and we value your comments.     Depression / Suicide Risk    As you are discharged from this [facility] facility, it is important to learn how to keep safe from harming yourself.    Recognize the warning signs:  Abrupt changes in personality, positive or negative- including increase in energy   Giving away possessions  Change in eating patterns- significant weight changes-  positive or negative  Change in sleeping patterns- unable to sleep or sleeping all the time   Unwillingness or inability to communicate  Depression  Unusual sadness, discouragement and loneliness  Talk of wanting to die  Neglect of personal appearance   Rebelliousness- reckless behavior  Withdrawal from people/activities they love  Confusion- inability to concentrate     If you or a loved one observes any of these behaviors or has concerns about self-harm, here's what you can do:  Talk about it- your feelings and reasons for harming yourself  Remove any means that you might use to hurt  yourself (examples: pills, rope, extension cords, firearm)  Get professional help from the community (Mental Health, Substance Abuse, psychological counseling)  Do not be alone:Call your Safe Contact- someone whom you trust who will be there for you.  Call your local CRISIS HOTLINE 328-6241 or 052-708-7787  Call your local Children's Mobile Crisis Response Team [place] (770) 484-6937 or www.Jiberish  Call the toll free National Suicide Prevention Hotlines   National Suicide Prevention Lifeline 195-633-CKOL (2513)  [facility] Line Network 800-SUICIDE (695-0840)    I acknowledge receipt and understanding of these Home Care instructions.

## 2023-09-18 NOTE — PROGRESS NOTES
Subjective:      Primary care physician: Shelby Echavarria M.D.  Referring Provider: Dr. Jaswinder Bowers    Chief Complaint: No chief complaint on file.    Diagnosis:   1. Hepatic cyst        2. Pain of upper abdomen        3. S/P laparoscopy        4. Inclusion cyst            History of presenting illness:  Zarina Hagen is a pleasant 72 y.o.  female with history including HTN, GERD, nausea/vomiting, imbalance, who presented with long-standing dull right-sided abdominal discomfort near under her rib cage. She also reports occasional sharp epigastric discomfort which has occurred several times, most recently in May 2023. She takes PPI for GERD.      CT ABDOMEN PELVIS without contrast on 5/9/23 noted multiple liver cysts, the largest 10.8 x 8.7 cm.     Patient seen in ED on 5/16/23 with complaint of episodes of left upper quadrant abdominal pain associated with nausea and vomiting, intermittently for the past 2 to 3 years.     Update 8/8/23  She is here today for evaluation what appears to be a fairly large liver cyst involving segment 5 and 6 in the inferior lobe of the right lobe of the liver.  The patient states that this has been going on for several years and over time its gotten worse.  The patient had an MRI which I personally reviewed from showing this mass that is appears to be a simple cyst.  She has multiple cyst throughout the liver but this is the most dominant 1 and appears to be stretching the capsule liver.  She also has a little bit of thickening of her gallbladder.  The cyst itself is adjacent to the gallbladder.  It is difficult to assess whether her pain is related to the cyst or cyst the gallbladder but her pain is not tied to eating.  The patient has had a hysterectomy the old-fashioned way with a low midline incision.  She is fairly healthy.  She does state when she takes a deep breath she gets short of breath or has worsening pain.  She is here with her  discuss neck steps.  We  reviewed the CT scan and the MRI from July 17 2023.    Update 9/19/23  She underwent a laparoscopic marsupialization of the segment 6 liver cyst on  9/7/23. Pathology results demonstrated a benign peritoneal/mesothelial inclusion cyst.    She presents for a two week post operative examination. She is doing well overall. Tramadol is helping with her pain. She feels the pain which is intermittent at times like when she is moving or in a certain position. She describes it as discomfort more pain. Dull/achy pain. It changes with moving position. She is 75% normal function. Her previous abdominal pain that would radiated down the left side of body prior to surgery has resolved. She denies any fevers, chills, chest pain, shortness of breath or difficulty breathing.    Past Medical History:   Diagnosis Date    Arrhythmia     Pt see's Dr. Su cardiologist    Breath shortness     with exertion    Heart burn     Hypertension     Indigestion      Past Surgical History:   Procedure Laterality Date    IN LAP PROC,SPLEEN,UNLISTED  9/7/2023    Procedure: LAPAROSCOPIC MARSUPIALIZATION OF LIVER CYST;  Surgeon: Richardson Vicente M.D.;  Location: SURGERY [facility];  Service: General    ABDOMINAL HYSTERECTOMY TOTAL      APPENDECTOMY      HEMORRHOIDECTOMY       No Known Allergies  Outpatient Encounter Medications as of 9/19/2023   Medication Sig Dispense Refill    promethazine (PHENERGAN) 12.5 MG tablet Take 1 Tablet by mouth every 6 hours as needed for Nausea/Vomiting. 30 Tablet 0    omeprazole (PRILOSEC) 20 MG delayed-release capsule Take 20 mg by mouth as needed. Indications: Heartburn      losartan (COZAAR) 50 MG Tab Take 50 mg by mouth every day.      dicyclomine (BENTYL) 20 MG Tab Take 20 mg by mouth every 6 hours.      hydroCHLOROthiazide (HYDRODIURIL) 25 MG Tab Take 25 mg by mouth every day.       No facility-administered encounter medications on file as of 9/19/2023.     Social History     Socioeconomic History     Marital status:      Spouse name: Not on file    Number of children: Not on file    Years of education: Not on file    Highest education level: Not on file   Occupational History    Not on file   Tobacco Use    Smoking status: Never    Smokeless tobacco: Never   Vaping Use    Vaping Use: Never used   Substance and Sexual Activity    Alcohol use: Not Currently     Comment: none    Drug use: Never    Sexual activity: Not on file   Other Topics Concern    Not on file   Social History Narrative    Not on file     Social Determinants of Health     Financial Resource Strain: Not on file   Food Insecurity: Not on file   Transportation Needs: Not on file   Physical Activity: Not on file   Stress: Not on file   Social Connections: Not on file   Intimate Partner Violence: Not on file   Housing Stability: Not on file      Social History     Tobacco Use   Smoking Status Never   Smokeless Tobacco Never     Social History     Substance and Sexual Activity   Alcohol Use Not Currently    Comment: none     Social History     Substance and Sexual Activity   Drug Use Never      No family history on file.      Review of Systems   Constitutional:  Negative for chills and fever.   Respiratory:  Negative for shortness of breath.    Cardiovascular:  Negative for chest pain.   Gastrointestinal:  Positive for abdominal pain. Negative for heartburn, nausea and vomiting.        Abodminal discomfort   Genitourinary:  Negative for dysuria, frequency and urgency.        Objective:   There were no vitals taken for this visit.    Physical Exam  Vitals reviewed.   Constitutional:       Appearance: Normal appearance.   Abdominal:      Comments: Incisions healing well, staples removed, no signs of infection   Musculoskeletal:         General: No swelling or tenderness.   Neurological:      Mental Status: She is alert.           Labs   Latest Reference Range & Units 09/06/23 14:59   WBC 4.8 - 10.8 K/uL 4.5 (L)   RBC 4.20 - 5.40 M/uL 4.60    Hemoglobin 12.0 - 16.0 g/dL 13.8   Hematocrit 37.0 - 47.0 % 43.7   MCV 81.4 - 97.8 fL 95.0   MCH 27.0 - 33.0 pg 30.0   MCHC 32.2 - 35.5 g/dL 31.6 (L)   RDW 35.9 - 50.0 fL 44.2   Platelet Count 164 - 446 K/uL 230   (L): Data is abnormally low    Latest Reference Range & Units 09/06/23 14:59   Sodium 135 - 145 mmol/L 138   Potassium 3.6 - 5.5 mmol/L 3.5 (L)   Chloride 96 - 112 mmol/L 103   Co2 20 - 33 mmol/L 26   Anion Gap 7.0 - 16.0  9.0   Glucose 65 - 99 mg/dL 129 (H)   Bun 8 - 22 mg/dL 17   Creatinine 0.50 - 1.40 mg/dL 0.81   GFR (CKD-EPI) >60 mL/min/1.73 m 2 76   Calcium 8.5 - 10.5 mg/dL 9.0   Correct Calcium 8.5 - 10.5 mg/dL 8.8   AST(SGOT) 12 - 45 U/L 21   ALT(SGPT) 2 - 50 U/L 19   Alkaline Phosphatase 30 - 99 U/L 46   Total Bilirubin 0.1 - 1.5 mg/dL 0.5   Albumin 3.2 - 4.9 g/dL 4.2   Total Protein 6.0 - 8.2 g/dL 6.9   Globulin 1.9 - 3.5 g/dL 2.7   A-G Ratio g/dL 1.6   (L): Data is abnormally low  (H): Data is abnormally high  Imaging  HIDA SCAN 8/16/23  IMPRESSION:  Normal hepatobiliary scan. No evidence of acute cholecystitis.  Normal gallbladder ejection fraction.    CT ABDOMEN PELVIS W/O (5/9/23)  Multiple liver cysts, the largest 10.8 x 8.7 cm.    Pathology  9/7/23 SURGICAL PATHOLOGY CONSULTATION     FINAL DIAGNOSIS:     A. Liver, cyst wall, excisional biopsy:          Benign peritoneal/mesothelial inclusion cyst     Comment: Histologic sections show a cyst with a bland cuboidal   mesothelial lining and a thickened fibrous wall with admixed chronic   inflammation and focal calcifications with adjacent benign liver. These   findings are diagnostic of a benign peritoneal inclusion cyst also   known as a benign mesothelial inclusion cyst. These can be reactive in   etiology and can occur secondary to prior abdominal surgery as well as   congenital in etiology as a congenital remnant of the coelomic   stuructures when they occur as primary hepatic mesothelial cyst(s).   Clinicoradiologic correlation is  suggested.     Procedures  9/7/23 Laparoscopic marsupialization of the segment 6 liver cyst.    Diagnosis:     1. Hepatic cyst        2. Pain of upper abdomen        3. S/P laparoscopy        4. Inclusion cyst            Medical Decision Making:  Today's Assessment / Status / Plan:     Patient seen with Dr. Vicente. Dr. Vicente has reviewed surgery and pathology results with the patient. He answered all questions and concerns. She is to return to office as needed .We have reviewed minimizing the usage of narcotics.

## 2023-09-19 ENCOUNTER — OFFICE VISIT (OUTPATIENT)
Dept: SURGICAL ONCOLOGY | Facility: MEDICAL CENTER | Age: 73
End: 2023-09-19
Payer: MEDICARE

## 2023-09-19 VITALS
BODY MASS INDEX: 24.9 KG/M2 | SYSTOLIC BLOOD PRESSURE: 148 MMHG | HEART RATE: 67 BPM | OXYGEN SATURATION: 98 % | DIASTOLIC BLOOD PRESSURE: 98 MMHG | WEIGHT: 159 LBS | TEMPERATURE: 98.7 F

## 2023-09-19 DIAGNOSIS — K76.89 HEPATIC CYST: ICD-10-CM

## 2023-09-19 DIAGNOSIS — R10.10 PAIN OF UPPER ABDOMEN: ICD-10-CM

## 2023-09-19 DIAGNOSIS — G89.18 POST-OP PAIN: ICD-10-CM

## 2023-09-19 DIAGNOSIS — L72.0 INCLUSION CYST: ICD-10-CM

## 2023-09-19 DIAGNOSIS — Z98.890 S/P LAPAROSCOPY: ICD-10-CM

## 2023-09-19 PROCEDURE — 3080F DIAST BP >= 90 MM HG: CPT | Performed by: SPECIALIST

## 2023-09-19 PROCEDURE — 99024 POSTOP FOLLOW-UP VISIT: CPT | Performed by: SPECIALIST

## 2023-09-19 PROCEDURE — 3077F SYST BP >= 140 MM HG: CPT | Performed by: SPECIALIST

## 2023-09-19 RX ORDER — TRAMADOL HYDROCHLORIDE 50 MG/1
50 TABLET ORAL EVERY 6 HOURS PRN
Qty: 28 TABLET | Refills: 0 | Status: SHIPPED | OUTPATIENT
Start: 2023-09-19 | End: 2023-09-26

## 2023-09-19 RX ORDER — TRAMADOL HYDROCHLORIDE 50 MG/1
50 TABLET ORAL EVERY 6 HOURS PRN
COMMUNITY
End: 2023-09-19 | Stop reason: SDUPTHER

## 2023-09-19 ASSESSMENT — ENCOUNTER SYMPTOMS
HEARTBURN: 0
ABDOMINAL PAIN: 1
CHILLS: 0
NAUSEA: 0
FEVER: 0
VOMITING: 0
SHORTNESS OF BREATH: 0

## 2023-09-19 ASSESSMENT — FIBROSIS 4 INDEX: FIB4 SCORE: 1.53

## (undated) DEVICE — SUTURE 0 VICRYL PLUS UR-6 - 27 INCH (36/BX)

## (undated) DEVICE — TUBE E-T HI-LO CUFF 6.5MM (10EA/BX)

## (undated) DEVICE — STAPLER SKIN DISP - (6/BX 10BX/CA) VISISTAT

## (undated) DEVICE — CANISTER SUCTION 3000ML MECHANICAL FILTER AUTO SHUTOFF MEDI-VAC NONSTERILE LF DISP  (40EA/CA)

## (undated) DEVICE — GLOVE BIOGEL SZ 7.5 SURGICAL PF LTX - (50PR/BX 4BX/CA)

## (undated) DEVICE — SET LEADWIRE 5 LEAD BEDSIDE DISPOSABLE ECG (1SET OF 5/EA)

## (undated) DEVICE — GOWN WARMING STANDARD FLEX - (30/CA)

## (undated) DEVICE — SLEEVE VASO CALF MED - (10PR/CA)

## (undated) DEVICE — COVER LIGHT HANDLE ALC PLUS DISP (18EA/BX)

## (undated) DEVICE — DRESSING TRANSPARENT FILM TEGADERM 2.375 X 2.75"  (100EA/BX)"

## (undated) DEVICE — SUCTION INSTRUMENT YANKAUER BULBOUS TIP W/O VENT (50EA/CA)

## (undated) DEVICE — GLOVE BIOGEL PI ORTHO SZ 7.5 PF LF (40PR/BX)

## (undated) DEVICE — PACK LAP CHOLE OR - (2EA/CA)

## (undated) DEVICE — DRAPESURG STERI-DRAPE LONG - (10/BX 4BX/CA)

## (undated) DEVICE — GLOVE BIOGEL SZ 8 SURGICAL PF LTX - (50PR/BX 4BX/CA)

## (undated) DEVICE — BAG RETRIEVAL 10ML (10EA/BX)

## (undated) DEVICE — SPONGE GAUZESTER 4 X 4 4PLY - (128PK/CA)

## (undated) DEVICE — HANDPIECE THUNDERBEAT 5MM 35CM FRONT GRIP TYPE S (5EA/BX)

## (undated) DEVICE — TUBING CLEARLINK DUO-VENT - C-FLO (48EA/CA)

## (undated) DEVICE — DRAPE IOBAN II INCISE 23X17 - (10EA/BX 4BX/CA)

## (undated) DEVICE — SET EXTENSION WITH 2 PORTS (48EA/CA) ***PART #2C8610 IS A SUBSTITUTE*****

## (undated) DEVICE — SUTURE GENERAL

## (undated) DEVICE — TROCAR 5X100 NON BLADED Z-TH - READ KII (6/BX)

## (undated) DEVICE — SENSOR OXIMETER ADULT SPO2 RD SET (20EA/BX)

## (undated) DEVICE — GLOVE BIOGEL SZ 6.5 SURGICAL PF LTX (50PR/BX 4BX/CA)

## (undated) DEVICE — SET TUBING PNEUMOCLEAR HIGH FLOW SMOKE EVACUATION (10EA/BX)

## (undated) DEVICE — SET SUCTION/IRRIGATION WITH DISPOSABLE TIP (6/CA )PART #0250-070-520 IS A SUB

## (undated) DEVICE — ELECTRODE DUAL RETURN W/ CORD - (50/PK)

## (undated) DEVICE — CHLORAPREP 26 ML APPLICATOR - ORANGE TINT(25/CA)

## (undated) DEVICE — GOWN SURGEONS X-LARGE - DISP. (30/CA)

## (undated) DEVICE — TROCAR LAPSCP 100MM 12MM NTHRD - (6/BX)

## (undated) DEVICE — LACTATED RINGERS INJ 1000 ML - (14EA/CA 60CA/PF)

## (undated) DEVICE — WATER IRRIGATION STERILE 1000ML (12EA/CA)

## (undated) DEVICE — CLOSURE SKIN STRIP 1/2 X 4 IN - (STERI STRIP) (50/BX 4BX/CA)